# Patient Record
Sex: FEMALE | Race: BLACK OR AFRICAN AMERICAN | NOT HISPANIC OR LATINO | Employment: FULL TIME | URBAN - NONMETROPOLITAN AREA
[De-identification: names, ages, dates, MRNs, and addresses within clinical notes are randomized per-mention and may not be internally consistent; named-entity substitution may affect disease eponyms.]

---

## 2023-12-12 ENCOUNTER — OFFICE VISIT (OUTPATIENT)
Dept: OBSTETRICS AND GYNECOLOGY | Facility: CLINIC | Age: 43
End: 2023-12-12
Payer: COMMERCIAL

## 2023-12-12 VITALS
WEIGHT: 202 LBS | HEIGHT: 67 IN | SYSTOLIC BLOOD PRESSURE: 126 MMHG | BODY MASS INDEX: 31.71 KG/M2 | DIASTOLIC BLOOD PRESSURE: 88 MMHG

## 2023-12-12 DIAGNOSIS — Z01.419 WOMEN'S ANNUAL ROUTINE GYNECOLOGICAL EXAMINATION: Primary | ICD-10-CM

## 2023-12-12 DIAGNOSIS — Z12.31 ENCOUNTER FOR SCREENING MAMMOGRAM FOR MALIGNANT NEOPLASM OF BREAST: ICD-10-CM

## 2023-12-12 PROCEDURE — G0123 SCREEN CERV/VAG THIN LAYER: HCPCS | Performed by: NURSE PRACTITIONER

## 2023-12-12 PROCEDURE — 87624 HPV HI-RISK TYP POOLED RSLT: CPT | Performed by: NURSE PRACTITIONER

## 2023-12-12 NOTE — PROGRESS NOTES
Chief Complaint   Patient presents with    Gynecologic Exam     Patient is new to our office and here to establish care.  Patient is due for annual well GYN Exam.  Last well GYN exam and pap 1.5 years ago in [facility], normal/-HPV. Last mammo 2.5 years ago, normal per pt report. Periods are regular, does not desire contraception.  No pregnancy history, states specialist in the past told her she didn't ovulate. Patient denies current pelvic pain, abnormal vaginal bleeding or discharge, dyspareunia, and voices no other complaints.        History:  Eda Nick is a 43 y.o. female who presents today for evaluation of the above problems.       Eda Nick is a 43 y.o. female ,  who comes to the office today for annual GYN examination. Last menstrual period was 11/28/2023  and her last Pap smear was 2022, and was normal. She has no history of cervical dysplasia. She is currently not on medication  for contraception and is doing well with them without complaints. Her medical history is reviewed.     Was seeing infertility Specialist in [place] Dr. Caldwell  Was on medication for anovulation but this increased her b/p.  She reports her spouse is 58 and has a low sperm count.           ROS:  Review of Systems   Constitutional: Negative.    HENT: Negative.     Eyes: Negative.    Respiratory: Negative.     Cardiovascular: Negative.    Gastrointestinal: Negative.    Endocrine: Negative.    Genitourinary: Negative.    Musculoskeletal: Negative.    Skin: Negative.    Neurological: Negative.    Psychiatric/Behavioral: Negative.         No Known Allergies  Past Medical History:   Diagnosis Date    Hypertension     Polycystic ovary syndrome      History reviewed. No pertinent surgical history.  Family History   Problem Relation Age of Onset    Breast cancer Paternal Grandmother     Ovarian cancer Neg Hx     Uterine cancer Neg Hx     Colon cancer Neg Hx     Melanoma Neg Hx       reports that she has never smoked. She has never used  "smokeless tobacco. She reports that she does not drink alcohol and does not use drugs.      Current Outpatient Medications:     Prenatal MV & Min w/FA-DHA (PRENATAL GUMMIES PO), Take  by mouth., Disp: , Rfl:     metoprolol tartrate (LOPRESSOR) 25 MG tablet, Take 1 tablet by mouth 2 (Two) Times a Day., Disp: , Rfl:     OBJECTIVE:  /88   Ht 170.2 cm (67\")   Wt 91.6 kg (202 lb)   LMP 11/28/2023 (Exact Date)   BMI 31.64 kg/m²    Physical Exam  Exam conducted with a chaperone present.   Constitutional:       Appearance: She is well-developed.   HENT:      Head: Normocephalic and atraumatic.   Eyes:      General: Lids are normal.      Conjunctiva/sclera: Conjunctivae normal.      Pupils: Pupils are equal, round, and reactive to light.   Neck:      Thyroid: No thyromegaly.   Cardiovascular:      Rate and Rhythm: Normal rate and regular rhythm.      Heart sounds: Normal heart sounds.   Pulmonary:      Effort: Pulmonary effort is normal.      Breath sounds: Normal breath sounds.   Chest:   Breasts:     Breasts are symmetrical.      Right: No inverted nipple, mass, nipple discharge, skin change or tenderness.      Left: No inverted nipple, mass, nipple discharge, skin change or tenderness.   Abdominal:      General: Bowel sounds are normal.      Palpations: Abdomen is soft.   Genitourinary:     Exam position: Supine.      Labia:         Right: No rash, tenderness, lesion or injury.         Left: No rash, tenderness, lesion or injury.       Vagina: No signs of injury and foreign body. No vaginal discharge, erythema, tenderness or bleeding.      Cervix: No cervical motion tenderness, discharge or friability.      Uterus: Not deviated, not enlarged, not fixed and not tender.       Adnexa:         Right: No mass, tenderness or fullness.          Left: No mass, tenderness or fullness.        Rectum: Normal. No tenderness or external hemorrhoid.   Musculoskeletal:         General: Normal range of motion.      Cervical " back: Normal range of motion and neck supple.   Skin:     General: Skin is warm and dry.   Neurological:      Mental Status: She is alert and oriented to person, place, and time.         Assessment/Plan    Diagnoses and all orders for this visit:    1. Women's annual routine gynecological examination (Primary)  -     Liquid-based Pap Smear, Screening  Immunizations:      - Tetanus: Unknown or >10 years ago. Recommend to have at pharmacy or on injury.      - Influenza: recommended annually      - Pneumovax:once after age 65      - Prevnar: Once after age 65      - Zostavax: Once after age 60  Colon Cancer Screening: Due at 45  Mammogram: order placed.  PAP: discussed guidelines. Done today.   DEXA: DEXA scan at 65  COVID vaccine information is available at vaccine.[place].gov      2. Encounter for screening mammogram for malignant neoplasm of breast  -     Mammo Screening Digital Tomosynthesis Bilateral With CAD; Future    She will return in one year. In the meantime if she develops questions or problems, she will notify the office.          An After Visit Summary was printed and given to the patient at discharge.  Return in about 1 year (around 12/12/2024) for Annual physical.          Nathaly Ignacio APRN 12/12/2023   Electronically signed

## 2023-12-13 LAB
GEN CATEG CVX/VAG CYTO-IMP: NORMAL
HPV I/H RISK 4 DNA CVX QL PROBE+SIG AMP: NOT DETECTED
LAB AP CASE REPORT: NORMAL
LAB AP GYN ADDITIONAL INFORMATION: NORMAL
LAB AP GYN OTHER FINDINGS: NORMAL
Lab: NORMAL
PATH INTERP SPEC-IMP: NORMAL
STAT OF ADQ CVX/VAG CYTO-IMP: NORMAL

## 2023-12-14 ENCOUNTER — PATIENT ROUNDING (BHMG ONLY) (OUTPATIENT)
Dept: OBSTETRICS AND GYNECOLOGY | Facility: CLINIC | Age: 43
End: 2023-12-14
Payer: COMMERCIAL

## 2023-12-14 NOTE — PROGRESS NOTES
December 14, 2023    Hello, may I speak with Eda Nick?    My name is Leana      I am  with [facility]  [address], [address]  [place] [address] [phone].    Before we get started may I verify your date of birth? 1980    I am calling to officially welcome you to our practice and ask about your recent visit. Is this a good time to talk? yes    Tell me about your visit with us. What things went well?  Yes, I loved the office.  Everyone was so nice.       We're always looking for ways to make our patients' experiences even better. Do you have recommendations on ways we may improve?  no    Overall were you satisfied with your first visit to our practice? yes       I appreciate you taking the time to speak with me today. Is there anything else I can do for you? no      Thank you, and have a great day.

## 2023-12-18 ENCOUNTER — TELEPHONE (OUTPATIENT)
Dept: OBSTETRICS AND GYNECOLOGY | Facility: CLINIC | Age: 43
End: 2023-12-18
Payer: COMMERCIAL

## 2023-12-18 NOTE — TELEPHONE ENCOUNTER
Pt called back regarding her pap results. Pt states she didn't quite understand her results and I explained to pt the result note from Nathaly. She does wish to add BV testing to her pap to see if she needs to be treated for anything because she is asymptomatic

## 2023-12-19 LAB — TRICHOMONAS VAGINALIS PCR: NOT DETECTED

## 2023-12-22 LAB
GEN CATEG CVX/VAG CYTO-IMP: NORMAL
LAB AP CASE REPORT: NORMAL
LAB AP GYN ADDITIONAL INFORMATION: NORMAL
LAB AP GYN OTHER FINDINGS: NORMAL
Lab: NORMAL
PATH INTERP SPEC-IMP: NORMAL
STAT OF ADQ CVX/VAG CYTO-IMP: NORMAL

## 2024-01-24 ENCOUNTER — TELEPHONE (OUTPATIENT)
Dept: OBSTETRICS AND GYNECOLOGY | Facility: CLINIC | Age: 44
End: 2024-01-24
Payer: COMMERCIAL

## 2024-01-24 NOTE — TELEPHONE ENCOUNTER
I called and spoke with pt. Pt states she does have the order given to her from the specialist. Pt advised she can go to outpatient lab and imaging with her order on CD 21 and they would be able to take care of this for her. Pt voiced understanding

## 2024-01-24 NOTE — TELEPHONE ENCOUNTER
Pt was in office for annual exam 12/12/23. Pt has called and left a v/m stating her fertility specialist in [place] has requested for her to have a 21 day Progesterone drawn and she is asking if you would order this for her. Please advise

## 2024-01-24 NOTE — TELEPHONE ENCOUNTER
Her specialist can fax the order to [facility] or to the patient and she can bring to order to our office and have the lab here draw this. This ensures the fertility specialist gets the result and in a timely manner.

## 2024-02-09 ENCOUNTER — LAB (OUTPATIENT)
Dept: LAB | Facility: HOSPITAL | Age: 44
End: 2024-02-09
Payer: COMMERCIAL

## 2024-02-09 ENCOUNTER — TRANSCRIBE ORDERS (OUTPATIENT)
Dept: ADMINISTRATIVE | Facility: HOSPITAL | Age: 44
End: 2024-02-09
Payer: COMMERCIAL

## 2024-02-09 DIAGNOSIS — Z98.890 HISTORY OF HYSTEROSALPINGOGRAM: ICD-10-CM

## 2024-02-09 DIAGNOSIS — Z98.890 HISTORY OF HYSTEROSALPINGOGRAM: Primary | ICD-10-CM

## 2024-02-09 LAB — PROGEST SERPL-MCNC: 14.6 NG/ML

## 2024-02-09 PROCEDURE — 36415 COLL VENOUS BLD VENIPUNCTURE: CPT

## 2024-02-09 PROCEDURE — 84144 ASSAY OF PROGESTERONE: CPT

## 2024-12-19 ENCOUNTER — INITIAL PRENATAL (OUTPATIENT)
Age: 44
End: 2024-12-19
Payer: COMMERCIAL

## 2024-12-19 VITALS — WEIGHT: 203 LBS | DIASTOLIC BLOOD PRESSURE: 84 MMHG | BODY MASS INDEX: 31.79 KG/M2 | SYSTOLIC BLOOD PRESSURE: 136 MMHG

## 2024-12-19 DIAGNOSIS — O09.819 PREGNANCY RESULTING FROM IN VITRO FERTILIZATION, ANTEPARTUM: ICD-10-CM

## 2024-12-19 DIAGNOSIS — Z36.3 SCREENING, ANTENATAL, FOR MALFORMATION BY ULTRASOUND: ICD-10-CM

## 2024-12-19 DIAGNOSIS — O36.80X0 ENCOUNTER TO DETERMINE FETAL VIABILITY OF PREGNANCY, SINGLE OR UNSPECIFIED FETUS: Primary | ICD-10-CM

## 2024-12-19 DIAGNOSIS — Z3A.10 10 WEEKS GESTATION OF PREGNANCY: ICD-10-CM

## 2024-12-19 DIAGNOSIS — O09.511 PRIMIGRAVIDA OF ADVANCED MATERNAL AGE IN FIRST TRIMESTER: ICD-10-CM

## 2024-12-19 DIAGNOSIS — Z71.85 IMMUNIZATION COUNSELING: ICD-10-CM

## 2024-12-19 DIAGNOSIS — O10.919 PRE-EXISTING HYPERTENSION AFFECTING PREGNANCY, ANTEPARTUM: ICD-10-CM

## 2024-12-19 DIAGNOSIS — Z28.21 INFLUENZA VACCINATION DECLINED: ICD-10-CM

## 2024-12-19 PROBLEM — O09.519 AMA (ADVANCED MATERNAL AGE) PRIMIGRAVIDA 35+: Status: ACTIVE | Noted: 2024-12-19

## 2024-12-19 RX ORDER — ESTRADIOL 2 MG/1
1 TABLET ORAL 3 TIMES DAILY
COMMUNITY
Start: 2024-12-13

## 2024-12-19 RX ORDER — PROGESTERONE 200 MG/1
200 CAPSULE ORAL DAILY
COMMUNITY

## 2024-12-19 NOTE — PROGRESS NOTES
44-year old patient arrived to initiate prenatal care.     HPI: . Patient's last menstrual period was 10/08/2024.  This was a desired pregnancy conceived through in vitro fertilization. Pre-pregnancy weight of 190 pounds at 6 weeks gestation.    Prenatal history significant for: n/a - primigravida    History significant for: NKA, hypertension - no longer on medication, obesity, PCOS, family history of breast cancer    The following portion of the patient's history were reviewed and updated as needed: allergies, current medications, past family history, past medical history, social history, surgical history, and problem list.    ROS: All systems reviewed and are negative with exception of the following: fatigue, spotting (with the vaginal progesterone)      US ordered today, reviewed and shows IUP of 10w0d gestation by crown-rump length measurement of 30.4 mm. Estimated Date of Delivery: 25.  Normal-appearing ovaries.  Comparison to 6-week ultrasound completed 2024 for consistent estimated date of delivery.      Pap Smear 2023: NILM    Exam:  Wt: 203 lb for TWG of 5.897 kg (13 lb), B/P 136/84, FHTs 179  General Appearance:  healthy-appearing . Appropriate mood and behavior.  HEENT:  Neck supple, no thyroidmegaly.  Cardiorespiratory: HR str and reg. No murmur. Lungs clear. Resp even and unlabored.  Abd: Soft, nontender. No CVA tenderness.   Ext: Calves non-tender. No cyanosis or edema.    Diagnoses and all orders for this visit:    1. Encounter to determine fetal viability of pregnancy, single or unspecified fetus (Primary)  Ultrasound today and reviewed.  Also reviewed ultrasound report of 2024.  -     TSH Rfx On Abnormal To Free T4  -     Hemoglobin A1c  -     Varicella Zoster Antibody, IgG  -     Rubella Antibody, IgG  -     RPR, Rfx Qn RPR / Confirm TP  -     Hepatitis B Surface Antigen  -     HCV Antibody Rfx To Qnt PCR  -     HIV-1 / O / 2 Ag / Antibody  -     CBC &  Differential  -     Antibody Screen  -     ABO / Rh  -     Urine Culture - Urine, Urine, Random Void  -     Chlamydia trachomatis, Neisseria gonorrhoeae, Trichomonas vaginalis, PCR - Swab, Urine, Random Void  -     Comprehensive Metabolic Panel  -     Uric Acid  -     Lactate Dehydrogenase  -     Protein / Creatinine Ratio, Urine - Urine, Clean Catch    2. 10 weeks gestation of pregnancy  Initial prenatal labs and referrals reviewed with orders entered.  -     Ambulatory Referral to [facility]/Perinatology    3. Primigravida of advanced maternal age in first trimester  Reviewed information in new OB packet, including OTC medications for use during pregnancy, safe/unsafe fish list, dental care in pregnancy, and [place] fetal chromosomal risk screening pamphlet. Declines screening Discussed first trimester of pregnancy and discomforts, regular OB routine, the options of ffDNA/chromosomal risk and maternal carrier screening testing.  Discussed also avoiding raw/undercooked meats, concerns with deli meats, and avoidance of unpasteurized dairy products/soft cheeses. Encouraged healthy lifestyle measures. Advised to maintain regular activity and/or exercise. Discussed bleeding and pelvic pain warnings and other signs to report.   -     ToxASSURE Select 13 (MW) - Urine, Clean Catch  -     Ambulatory Referral to [facility]/Perinatology    4. Pre-existing hypertension affecting pregnancy, antepartum  Reports history of hypertension with treatment with medication that medication discontinued after patient began monitoring sodium intake.  -     CBC & Differential  -     Comprehensive Metabolic Panel  -     Uric Acid  -     Lactate Dehydrogenase  -     Protein / Creatinine Ratio, Urine - Urine, Clean Catch  -     Ambulatory Referral to [facility]/Perinatology    5. Pregnancy resulting from in vitro fertilization, antepartum  Has a follow-up in [place] next week.  Believes this will be the last visit.  Has 1 more progesterone injection.  Continued  use of vaginal progesterone to be reviewed at that visit.  -     Ambulatory Referral to MFM/Perinatology    6. Immunization counseling  Discussed influenza vaccine recommendation during pregnancy.  Patient declines vaccine at this time.    7. Influenza vaccination declined  Discussed screening if experiences symptoms of the flu.    8. Screening, , for malformation by ultrasound  Discussed anatomy scan to be completed through perinatology at approximately 20-weeks gestation. Anatomy scan completed through collaboration with their practice to assess for possible anomalies or markers for aneuploidy.   -     Ambulatory Referral to MFM/Perinatology        Refer to AVS instructions for additional education provided        Return to the office in 4 weeks for a routine prenatal visit with me (while considering obstetricians) and as needed with concerns.        This note has been signed electronically.     Brooklynn Melton, DNP, APRN, CNM, RNC-OB

## 2024-12-22 LAB
ABO GROUP BLD: NORMAL
ALBUMIN SERPL-MCNC: 4 G/DL (ref 3.9–4.9)
ALP SERPL-CCNC: 50 IU/L (ref 44–121)
ALT SERPL-CCNC: 13 IU/L (ref 0–32)
AST SERPL-CCNC: 21 IU/L (ref 0–40)
BACTERIA UR CULT: NO GROWTH
BACTERIA UR CULT: NORMAL
BASOPHILS # BLD AUTO: 0 X10E3/UL (ref 0–0.2)
BASOPHILS NFR BLD AUTO: 0 %
BILIRUB SERPL-MCNC: 0.2 MG/DL (ref 0–1.2)
BLD GP AB SCN SERPL QL: NEGATIVE
BUN SERPL-MCNC: 7 MG/DL (ref 6–24)
BUN/CREAT SERPL: 8 (ref 9–23)
C TRACH RRNA SPEC QL NAA+PROBE: NEGATIVE
CALCIUM SERPL-MCNC: 9.5 MG/DL (ref 8.7–10.2)
CHLORIDE SERPL-SCNC: 102 MMOL/L (ref 96–106)
CO2 SERPL-SCNC: 21 MMOL/L (ref 20–29)
CREAT SERPL-MCNC: 0.87 MG/DL (ref 0.57–1)
CREAT UR-MCNC: 22.2 MG/DL
EGFRCR SERPLBLD CKD-EPI 2021: 84 ML/MIN/1.73
EOSINOPHIL # BLD AUTO: 0.1 X10E3/UL (ref 0–0.4)
EOSINOPHIL NFR BLD AUTO: 2 %
ERYTHROCYTE [DISTWIDTH] IN BLOOD BY AUTOMATED COUNT: 13.2 % (ref 11.7–15.4)
GLOBULIN SER CALC-MCNC: 3.4 G/DL (ref 1.5–4.5)
GLUCOSE SERPL-MCNC: 94 MG/DL (ref 70–99)
HBA1C MFR BLD: 6.2 % (ref 4.8–5.6)
HBV SURFACE AG SERPL QL IA: NEGATIVE
HCT VFR BLD AUTO: 39.9 % (ref 34–46.6)
HCV AB SERPL QL IA: NORMAL
HCV IGG SERPL QL IA: NON REACTIVE
HGB BLD-MCNC: 13.4 G/DL (ref 11.1–15.9)
HIV 1+2 AB+HIV1 P24 AG SERPL QL IA: NON REACTIVE
IMM GRANULOCYTES # BLD AUTO: 0 X10E3/UL (ref 0–0.1)
IMM GRANULOCYTES NFR BLD AUTO: 0 %
LDH SERPL L TO P-CCNC: 272 IU/L (ref 119–226)
LYMPHOCYTES # BLD AUTO: 1.6 X10E3/UL (ref 0.7–3.1)
LYMPHOCYTES NFR BLD AUTO: 40 %
MCH RBC QN AUTO: 28.5 PG (ref 26.6–33)
MCHC RBC AUTO-ENTMCNC: 33.6 G/DL (ref 31.5–35.7)
MCV RBC AUTO: 85 FL (ref 79–97)
MONOCYTES # BLD AUTO: 0.4 X10E3/UL (ref 0.1–0.9)
MONOCYTES NFR BLD AUTO: 11 %
N GONORRHOEA RRNA SPEC QL NAA+PROBE: NEGATIVE
NEUTROPHILS # BLD AUTO: 1.9 X10E3/UL (ref 1.4–7)
NEUTROPHILS NFR BLD AUTO: 47 %
PLATELET # BLD AUTO: 280 X10E3/UL (ref 150–450)
POTASSIUM SERPL-SCNC: 4.6 MMOL/L (ref 3.5–5.2)
PROT SERPL-MCNC: 7.4 G/DL (ref 6–8.5)
PROT UR-MCNC: <4 MG/DL
PROT/CREAT UR: ABNORMAL MG/G CREAT (ref 0–200)
RBC # BLD AUTO: 4.7 X10E6/UL (ref 3.77–5.28)
RH BLD: POSITIVE
RPR SER QL: NON REACTIVE
RUBV IGG SERPL IA-ACNC: 6.81 INDEX
SODIUM SERPL-SCNC: 138 MMOL/L (ref 134–144)
T VAGINALIS RRNA SPEC QL NAA+PROBE: NEGATIVE
TSH SERPL DL<=0.005 MIU/L-ACNC: 1.25 UIU/ML (ref 0.45–4.5)
URATE SERPL-MCNC: 4.5 MG/DL (ref 2.6–6.2)
VZV IGG SER QL IA: REACTIVE
WBC # BLD AUTO: 4.1 X10E3/UL (ref 3.4–10.8)

## 2024-12-25 DIAGNOSIS — R73.09 ELEVATED HEMOGLOBIN A1C: Primary | ICD-10-CM

## 2024-12-30 LAB — DRUGS UR: NORMAL

## 2025-01-02 ENCOUNTER — OFFICE VISIT (OUTPATIENT)
Age: 45
End: 2025-01-02
Payer: COMMERCIAL

## 2025-01-02 VITALS
DIASTOLIC BLOOD PRESSURE: 88 MMHG | HEIGHT: 67 IN | WEIGHT: 200.6 LBS | SYSTOLIC BLOOD PRESSURE: 142 MMHG | BODY MASS INDEX: 31.48 KG/M2

## 2025-01-02 DIAGNOSIS — O03.9: Primary | ICD-10-CM

## 2025-01-02 RX ORDER — METHYLERGONOVINE MALEATE 0.2 MG/1
0.2 TABLET ORAL EVERY 6 HOURS
Qty: 4 TABLET | Refills: 0 | Status: SHIPPED | OUTPATIENT
Start: 2025-01-02 | End: 2025-01-03

## 2025-01-02 RX ORDER — MISOPROSTOL 200 UG/1
600 TABLET ORAL ONCE
Qty: 3 TABLET | Refills: 0 | Status: SHIPPED | OUTPATIENT
Start: 2025-01-02 | End: 2025-01-02

## 2025-01-02 NOTE — PROGRESS NOTES
"Patricia Nick is a 44 y.o. female    History of Present Illness  Arrived for a gyne visit with an ultrasound for a spontaneous miscarriage. She was seen in [place] emergency department on 12/30/2024 for threatened miscarriage. She had awoken that morning with a gush of blood and had gotten in the shower. While in the shower, she believes she passed the fetus and took it to the Emergency Department with her. She was diagnosed with an interval miscarriage. Since then, she has experienced two episodes each day of a gush of blood and clots - soaking her pad and clothes. She will otherwise change her pad every hour approximately and note a moderate amount of blood on the pad with clots.         /88 Comment: reported by MIGDALIA Ulloa MA  Ht 170.2 cm (67.01\")   Wt 91 kg (200 lb 9.6 oz)   LMP 10/08/2024 Comment: MAB  Breastfeeding No   BMI 31.41 kg/m²     Outpatient Encounter Medications as of 1/2/2025   Medication Sig Dispense Refill    Prenatal MV & Min w/FA-DHA (PRENATAL GUMMIES PO) Take  by mouth.      vitamin D3 125 MCG (5000 UT) capsule capsule Take 1 capsule by mouth Daily.      methylergonovine (Methergine) 0.2 MG tablet Take 1 tablet by mouth Every 6 (Six) Hours for 1 day. 4 tablet 0    miSOPROStol (Cytotec) 200 MCG tablet Take 3 tablets by mouth 1 time for 1 dose. 3 tablet 0    [DISCONTINUED] estradiol (ESTRACE) 2 MG tablet Take 1 tablet by mouth 3 times a day.      [DISCONTINUED] Progesterone (PROMETRIUM) 200 MG capsule Take 1 capsule by mouth Daily. Insert vaginally and inject into the skin       No facility-administered encounter medications on file as of 1/2/2025.       Surgical History  Past Surgical History:   Procedure Laterality Date    WISDOM TOOTH EXTRACTION         Family History  Family History   Problem Relation Age of Onset    Breast cancer Maternal Aunt 57    Breast cancer Paternal Aunt     Breast cancer Paternal Grandmother     Ovarian cancer Neg Hx     Uterine cancer " Neg Hx     Colon cancer Neg Hx     Melanoma Neg Hx        The following portions of the patient's history were reviewed and updated as appropriate: allergies, current medications, past family history, past medical history, past social history, past surgical history, and problem list.    Review of Systems   Constitutional:  Positive for fatigue.   Respiratory:  Negative for shortness of breath.    Cardiovascular:  Negative for chest pain and leg swelling.   Gastrointestinal:  Negative for abdominal pain, nausea and vomiting.   Endocrine: Negative for cold intolerance and heat intolerance.   Genitourinary:  Positive for vaginal bleeding. Negative for difficulty urinating, dysuria, menstrual problem, pelvic pain and vaginal discharge.   Musculoskeletal:  Negative for back pain.   Skin:  Negative for rash and wound.   Neurological:  Negative for dizziness and headache.   Hematological:  Does not bruise/bleed easily.   Psychiatric/Behavioral:  Positive for dysphoric mood. Negative for self-injury, suicidal ideas and depressed mood. The patient is nervous/anxious.        Objective   Physical Exam  Vitals and nursing note reviewed. Exam conducted with a chaperone present.   Constitutional:       General: She is not in acute distress.     Appearance: Normal appearance. She is well-developed. She is not ill-appearing or diaphoretic.   HENT:      Head: Normocephalic and atraumatic.      Right Ear: External ear normal.      Left Ear: External ear normal.   Eyes:      General: Lids are normal. No scleral icterus.        Right eye: No discharge.         Left eye: No discharge.      Extraocular Movements: Extraocular movements intact.      Conjunctiva/sclera: Conjunctivae normal.   Neck:      Trachea: Phonation normal. No tracheal deviation.   Cardiovascular:      Rate and Rhythm: Normal rate and regular rhythm.      Heart sounds: Normal heart sounds. No murmur heard.  Pulmonary:      Effort: No accessory muscle usage or  respiratory distress.      Breath sounds: Normal breath sounds and air entry. No wheezing.   Chest:      Chest wall: No tenderness.   Abdominal:      General: There is no distension.      Palpations: Abdomen is soft.      Tenderness: There is no abdominal tenderness. There is no right CVA tenderness, left CVA tenderness, guarding or rebound.   Genitourinary:     General: Normal vulva.      Exam position: Supine.      Pubic Area: No rash.       Labia:         Right: No rash, tenderness or lesion.         Left: No rash, tenderness or lesion.       Vagina: Normal. No signs of injury. Bleeding (vaginal vault with blood - mod large amount with two blood clots just smaller than golf ball noted and removed with swabs) present. No vaginal discharge, erythema or tenderness.      Cervix: Cervical bleeding (continues from cervical os) present. No cervical motion tenderness, discharge, friability, lesion or erythema.      Uterus: Not tender.       Rectum: Normal.      Comments: BSU normal. Perineum normal.  Musculoskeletal:         General: No tenderness. Normal range of motion.      Cervical back: Neck supple. No rigidity or tenderness. No pain with movement. Normal range of motion.      Right lower leg: No edema.      Left lower leg: No edema.   Lymphadenopathy:      Head:      Right side of head: No submental, submandibular, tonsillar, preauricular or posterior auricular adenopathy.      Left side of head: No submental, submandibular, tonsillar, preauricular or posterior auricular adenopathy.      Cervical: No cervical adenopathy.      Upper Body:      Right upper body: No supraclavicular or pectoral adenopathy.      Left upper body: No supraclavicular or pectoral adenopathy.   Skin:     General: Skin is warm and dry.      Coloration: Skin is not ashen, cyanotic, jaundiced, mottled, pale or sallow.      Findings: No bruising, erythema, lesion or rash.   Neurological:      Mental Status: She is alert and oriented to person,  place, and time.      Gait: Gait normal.   Psychiatric:         Attention and Perception: Attention and perception normal.         Mood and Affect: Mood and affect normal.         Speech: Speech normal.         Behavior: Behavior normal. Behavior is cooperative.         Thought Content: Thought content normal.         Judgment: Judgment normal.           Ultrasound today: Anteverted uterus measures 8.7 x 7.5 x 5.6 cm with endometrial lining measuring 13.3 mm. Uterine fibroid measures 3.9 x 3.3 x 3.2 cm. Normal-appearing ovaries.     Ultrasound 2024: No intrauterine gestation present, compatible with interval miscarriage. Fundal fibroid measures 2.6 x 3.1 x 3.6 cm.        Hcg 2024: 82,465 mIU/mL        Chart reviewed for screenings  Last Pap Smear: 2023 NILM; HPV not detected   Last Mammogram: 2020 negative Bi-Rads category 1 ultrasound. Family history noted in paternal aunt, maternal aunt, and paternal grandmother. Will discuss referral for genetic counseling at future appointment.  Last Colonoscopy: Begin screening age 44 and will discuss at future appointment.   Last Bone Density Scan: Begin screening by 5 years after the onset of menopause.          Assessment & Plan   Diagnoses and all orders for this visit:    1. Spontaneous  with heavy bleeding (Primary)  -     miSOPROStol (Cytotec) 200 MCG tablet; Take 3 tablets by mouth 1 time for 1 dose.  Dispense: 3 tablet; Refill: 0  -     methylergonovine (Methergine) 0.2 MG tablet; Take 1 tablet by mouth Every 6 (Six) Hours for 1 day.  Dispense: 4 tablet; Refill: 0         Ultrasound today and reviewed. Speculum examination with bleeding with clots noted in the vaginal vault and coming from cervical os. Ultrasound of today, , and , patient history, and today's assessment reviewed with Dr. Pond for collaboration with plan of care. Discussed with patient plan of care with use of Cytotec to aid with clearing uterine debris/clots  and cytotec and methergine for support for heavy bleeding. Discussed plan of care including medications, pelvic rest, rest with return to work Monday with patient, bleeding and pain warnings, and plan for follow-up. Questions answered and understanding verbalized. She has a follow-up with the fertility specialist the 16th and plans to proceed with another IVF procedure upcoming.          Refer to AVS instructions for additional education provided.         Return to the clinic Tuesday for surveillance after medication for support for heavy bleeding with early pregnancy loss and as needed with concerns. Will also plan for 6 week postpartum loss follow-up.         This note has been signed electronically.    Brooklynn Melton, GENOVEVA, APRN, CNM  1/2/2025

## 2025-01-07 ENCOUNTER — OFFICE VISIT (OUTPATIENT)
Age: 45
End: 2025-01-07
Payer: COMMERCIAL

## 2025-01-07 VITALS
HEIGHT: 67 IN | DIASTOLIC BLOOD PRESSURE: 74 MMHG | SYSTOLIC BLOOD PRESSURE: 128 MMHG | BODY MASS INDEX: 31.71 KG/M2 | WEIGHT: 202 LBS

## 2025-01-07 DIAGNOSIS — N89.8 VAGINAL ODOR: ICD-10-CM

## 2025-01-07 DIAGNOSIS — O03.9: Primary | ICD-10-CM

## 2025-01-07 NOTE — PROGRESS NOTES
"Subjective     Eda Nick is a 44 y.o. female    History of Present Illness  Arrived for a gyne visit with an ultrasound for follow-up after a spontaneous miscarriage with symptomatic heavy bleeding afterwards. Reports the bleeding has slowed down. It is darker brown, has an odor, and is more like menses. She does have concerns with a vaginal odor. Describes it as not being like a fish odor but more like an old foul odor. Has maintained pelvic rest. Relates her mood is \"okay.\" Has returned to work.         /74   Ht 170.2 cm (67\")   Wt 91.6 kg (202 lb)   LMP 10/08/2024 Comment: MAB  Breastfeeding No   BMI 31.64 kg/m²     Outpatient Encounter Medications as of 1/7/2025   Medication Sig Dispense Refill    Prenatal MV & Min w/FA-DHA (PRENATAL GUMMIES PO) Take  by mouth. (Patient not taking: Reported on 1/7/2025)      vitamin D3 125 MCG (5000 UT) capsule capsule Take 1 capsule by mouth Daily. (Patient not taking: Reported on 1/7/2025)       No facility-administered encounter medications on file as of 1/7/2025.       Surgical History  Past Surgical History:   Procedure Laterality Date    WISDOM TOOTH EXTRACTION         Family History  Family History   Problem Relation Age of Onset    Breast cancer Maternal Aunt 57    Breast cancer Paternal Aunt     Breast cancer Paternal Grandmother     Ovarian cancer Neg Hx     Uterine cancer Neg Hx     Colon cancer Neg Hx     Melanoma Neg Hx        The following portions of the patient's history were reviewed and updated as appropriate: allergies, current medications, past family history, past medical history, past social history, past surgical history, and problem list.    Review of Systems   Constitutional:  Positive for fatigue.   Respiratory:  Negative for shortness of breath.    Cardiovascular:  Negative for chest pain and leg swelling.   Gastrointestinal:  Negative for abdominal pain.   Genitourinary:  Positive for vaginal bleeding and vaginal discharge (with odor). " Negative for difficulty urinating, dysuria, menstrual problem and pelvic pain.   Musculoskeletal:  Negative for back pain.   Skin:  Negative for rash and wound.   Neurological:  Negative for dizziness and headache.   Hematological:  Does not bruise/bleed easily.   Psychiatric/Behavioral:  Negative for self-injury, suicidal ideas and depressed mood. The patient is not nervous/anxious.        Objective   Physical Exam  Vitals and nursing note reviewed. Exam conducted with a chaperone present.   Constitutional:       General: She is not in acute distress.     Appearance: Normal appearance. She is well-developed. She is obese. She is not ill-appearing or diaphoretic.   HENT:      Head: Normocephalic and atraumatic.      Right Ear: External ear normal.      Left Ear: External ear normal.   Eyes:      General: Lids are normal. No scleral icterus.        Right eye: No discharge.         Left eye: No discharge.      Extraocular Movements: Extraocular movements intact.      Conjunctiva/sclera: Conjunctivae normal.   Neck:      Trachea: Phonation normal. No tracheal deviation.   Cardiovascular:      Rate and Rhythm: Normal rate and regular rhythm.      Heart sounds: Normal heart sounds. No murmur heard.  Pulmonary:      Effort: No accessory muscle usage or respiratory distress.      Breath sounds: Normal breath sounds and air entry. No wheezing.   Chest:      Chest wall: No tenderness.   Abdominal:      General: There is no distension.      Palpations: Abdomen is soft.      Tenderness: There is no abdominal tenderness. There is no right CVA tenderness, left CVA tenderness, guarding or rebound.   Genitourinary:     General: Normal vulva.      Exam position: Supine.      Pubic Area: No rash or pubic lice.       Labia:         Right: No rash, tenderness or lesion.         Left: No rash, tenderness or lesion.       Vagina: Normal. No signs of injury and foreign body. Bleeding (small amount noted in the vaginal vault) present. No  vaginal discharge, erythema, tenderness, lesions or prolapsed vaginal walls.      Cervix: No cervical motion tenderness, discharge, friability, lesion or erythema.      Rectum: Normal.      Comments: BSU and perineum normal. Purplish look to the cervix. .   Musculoskeletal:         General: No tenderness. Normal range of motion.      Cervical back: Neck supple. No rigidity or tenderness. No pain with movement. Normal range of motion.      Right lower leg: No edema.      Left lower leg: No edema.   Lymphadenopathy:      Head:      Right side of head: No submental, submandibular, tonsillar, preauricular or posterior auricular adenopathy.      Left side of head: No submental, submandibular, tonsillar, preauricular or posterior auricular adenopathy.      Cervical: No cervical adenopathy.      Upper Body:      Right upper body: No supraclavicular or pectoral adenopathy.      Left upper body: No supraclavicular or pectoral adenopathy.   Skin:     General: Skin is warm and dry.      Coloration: Skin is not ashen, cyanotic, jaundiced, mottled, pale or sallow.      Findings: No bruising, erythema, lesion or rash.   Neurological:      Mental Status: She is alert and oriented to person, place, and time.      Gait: Gait normal.   Psychiatric:         Attention and Perception: Attention and perception normal.         Mood and Affect: Mood and affect normal.         Speech: Speech normal.         Behavior: Behavior normal. Behavior is cooperative.         Thought Content: Thought content normal.         Judgment: Judgment normal.         Ultrasound today: Uterus measures 9.8 x 6.2 x 5.6 cm. Submucosal fibroid measures 2.8 x 2.9 x 3.2 cm. Normal-appearing ovaries.     Ultrasound 1/2/2025: Anteverted uterus measures 8.7 x 7.5 . 5.6 cm with endometrial lining measuring 13.3 mm. Uterine fibroid measures 3.9 x 3.3 x 3.2 cm. Normal-appearing ovaries.         Chart reviewed for screenings  Last Pap Smear: 12/12/2023 NILM; HPV not  detected   Last Mammogram: 2020 Bi-rads category 1 negative with ultrasound. Family history noted in paternal aunt, maternal aunt, and paternal grandmother.    Last Colonoscopy: Begin screening age 44.   Last Bone Density Scan: Begin screening by 5 years after the onset of menopause.          Assessment & Plan   Diagnoses and all orders for this visit:    1. Spontaneous  with heavy bleeding (Primary)    2. Vaginal odor  -     NuSwab VG+ - Swab, Vagina       Ultrasound today and reviewed. Comparison with previous ultrasound.. Vaginal exam today with small amount of blood noted in vaginal vault. Culture obtained and discussed vaginal health and measures of support. Reviewed signs to report. Has appointment with reporductive specialist to discuss when next IVF procedure may take place. She plans to let this provider know the plan developed with her upcoming appointment.        Return in 5 weeks for follow-up after spontaneous early pregnancy loss and as needed with concerns.         This note has been signed electronically.    Brooklynn Melton, GEONVEVA, APRN, CNM  2025

## 2025-01-08 ENCOUNTER — TELEPHONE (OUTPATIENT)
Dept: OBSTETRICS AND GYNECOLOGY | Age: 45
End: 2025-01-08
Payer: COMMERCIAL

## 2025-01-08 NOTE — TELEPHONE ENCOUNTER
Pt called with c/o passing what looked like a clot and also products of conception. Pt was seen in office yesterday for missed ab and had ultrasound at that time.  Pt voiced that she is not having any pain. Pt advised to monitor bleeding and if it gets worse to where she is filling a pad per hour she need to go to ER. Pt following up with her IVF doctor on 1-16-25. Brooklynn Melton notified.

## 2025-01-09 DIAGNOSIS — N76.0 BV (BACTERIAL VAGINOSIS): Primary | ICD-10-CM

## 2025-01-09 DIAGNOSIS — B96.89 BV (BACTERIAL VAGINOSIS): Primary | ICD-10-CM

## 2025-01-09 LAB
A VAGINAE DNA VAG QL NAA+PROBE: ABNORMAL SCORE
BVAB2 DNA VAG QL NAA+PROBE: ABNORMAL SCORE
C ALBICANS DNA VAG QL NAA+PROBE: NEGATIVE
C GLABRATA DNA VAG QL NAA+PROBE: NEGATIVE
C TRACH DNA SPEC QL NAA+PROBE: NEGATIVE
MEGA1 DNA VAG QL NAA+PROBE: ABNORMAL SCORE
N GONORRHOEA DNA VAG QL NAA+PROBE: NEGATIVE
T VAGINALIS DNA VAG QL NAA+PROBE: NEGATIVE

## 2025-01-09 RX ORDER — METRONIDAZOLE 500 MG/1
500 TABLET ORAL 2 TIMES DAILY
Qty: 14 TABLET | Refills: 0 | Status: SHIPPED | OUTPATIENT
Start: 2025-01-09 | End: 2025-01-16

## 2025-05-05 ENCOUNTER — TELEPHONE (OUTPATIENT)
Dept: OBGYN CLINIC | Age: 45
End: 2025-05-05

## 2025-06-16 ENCOUNTER — OFFICE VISIT (OUTPATIENT)
Dept: OBGYN CLINIC | Age: 45
End: 2025-06-16
Payer: COMMERCIAL

## 2025-06-16 VITALS
DIASTOLIC BLOOD PRESSURE: 120 MMHG | WEIGHT: 199 LBS | BODY MASS INDEX: 31.23 KG/M2 | HEART RATE: 71 BPM | SYSTOLIC BLOOD PRESSURE: 180 MMHG | HEIGHT: 67 IN

## 2025-06-16 DIAGNOSIS — I10 HYPERTENSION, UNSPECIFIED TYPE: ICD-10-CM

## 2025-06-16 DIAGNOSIS — N97.0 INFERTILITY ASSOCIATED WITH ANOVULATION: ICD-10-CM

## 2025-06-16 DIAGNOSIS — Z76.89 ENCOUNTER TO ESTABLISH CARE WITH NEW DOCTOR: Primary | ICD-10-CM

## 2025-06-16 PROCEDURE — 3077F SYST BP >= 140 MM HG: CPT | Performed by: OBSTETRICS & GYNECOLOGY

## 2025-06-16 PROCEDURE — 1036F TOBACCO NON-USER: CPT | Performed by: OBSTETRICS & GYNECOLOGY

## 2025-06-16 PROCEDURE — 99204 OFFICE O/P NEW MOD 45 MIN: CPT | Performed by: OBSTETRICS & GYNECOLOGY

## 2025-06-16 PROCEDURE — G8417 CALC BMI ABV UP PARAM F/U: HCPCS | Performed by: OBSTETRICS & GYNECOLOGY

## 2025-06-16 PROCEDURE — G8427 DOCREV CUR MEDS BY ELIG CLIN: HCPCS | Performed by: OBSTETRICS & GYNECOLOGY

## 2025-06-16 PROCEDURE — 3080F DIAST BP >= 90 MM HG: CPT | Performed by: OBSTETRICS & GYNECOLOGY

## 2025-06-16 RX ORDER — ASPIRIN 81 MG/1
81 TABLET, CHEWABLE ORAL DAILY
COMMUNITY

## 2025-06-16 RX ORDER — NIFEDIPINE 30 MG/1
30 TABLET, EXTENDED RELEASE ORAL DAILY
Qty: 30 TABLET | Refills: 1 | Status: SHIPPED | OUTPATIENT
Start: 2025-06-16

## 2025-06-16 RX ORDER — ESTRADIOL 2 MG/1
2 TABLET ORAL 3 TIMES DAILY
COMMUNITY
Start: 2025-05-14

## 2025-06-16 NOTE — PROGRESS NOTES
Pt is here today for a new pt visit to establish care, currently going through embryo transfer         Neela Augustin (:  1980) is a 44 y.o. female, New patient, here for evaluation of the following chief complaint(s):  Annual Exam and New Patient        Subjective   History of Present Illness  The patient presents for evaluation of blood pressure and fertility issues.    She is currently not on any antihypertensive medication. Her blood pressure typically elevates when she experiences stress or anxiety. She does not have a primary care physician at present.    She was previously under the care of Dr. Hailee Ramos for routine gynecological care, including prenatal care during her pregnancy. Unfortunately, the pregnancy ended in a miscarriage in 2024. She is now under the care of Dr. Moose Gillespie in [place] and is considering another embryo transfer. However, she is experiencing difficulty with endometrial thickening, which is causing her significant stress. This issue did not occur during her first pregnancy. She has been on this fertility journey for 2 years. She has been prescribed estradiol injections and is up to date with her Pap smear, having had one in 2025. She recalls an abnormal Pap smear result from her youth. She did not undergo a D and C procedure following her miscarriage, opting instead for natural expulsion. She missed her medication doses on Saturday and  but has since resumed her regimen.    GYNECOLOGICAL HISTORY:  - Gynecology History discussed    OBSTETRICAL HISTORY:  - Obstetrics History discussed  -  1, Para 0    Review of Systems       History reviewed. No pertinent past medical history.  History reviewed. No pertinent family history.  Past Surgical History:   Procedure Laterality Date    CERVICAL POLYP REMOVAL          Current Outpatient Medications   Medication Sig Dispense Refill    estradiol (ESTRACE) 2 MG tablet Take 1 tablet by mouth 3 times daily

## 2025-06-23 ENCOUNTER — TELEPHONE (OUTPATIENT)
Dept: OBGYN CLINIC | Age: 45
End: 2025-06-23

## 2025-06-23 NOTE — TELEPHONE ENCOUNTER
Patient reaching out to inform clinic that the BP medication she was put on at previous visit (procardia) does not seem to be working. States BP is still elevated. Questioning change to medicine.

## 2025-06-27 NOTE — TELEPHONE ENCOUNTER
Spoke with Dr. Stuart, per Dr. Stuart she has spoken with pt & addressed recommendations over the phone.  BRANDY Yo